# Patient Record
(demographics unavailable — no encounter records)

---

## 2025-01-30 NOTE — DATA REVIEWED
[de-identified] : MRI lumbar spine [facility] 2023: L3-L5 stenosis  [de-identified] : Xray b/l hips 06/02/23 [facility]

## 2025-01-30 NOTE — ASSESSMENT
[FreeTextEntry1] : 50 y/o M PMHx LBP, DM, HLD, CAD, hx of MI, LBP radiating down b/l legs and difficulty with lateral movement. MRI showed L3-5 stenosis. He is here to discuss surgical intervention.

## 2025-01-30 NOTE — PHYSICAL EXAM
[General Appearance - Alert] : alert [General Appearance - In No Acute Distress] : in no acute distress [Oriented To Time, Place, And Person] : oriented to person, place, and time [Impaired Insight] : insight and judgment were intact [Affect] : the affect was normal [Sensation Tactile Decrease] : light touch was intact [Balance] : balance was intact [No Tenderness to Palpation] : no spine tenderness on palpation [Straight-Leg Raise Test - Left] : straight leg raise of the left leg was positive [Straight-Leg Raise Test - Right] : straight leg raise of the right leg was positive [4] : 4/5 Ankle Dorsiflexor (L4) [5] : 5/5 Ankle Plantar Flexion (S1) [Sclera] : the sclera and conjunctiva were normal [PERRL With Normal Accommodation] : pupils were equal in size, round, reactive to light, with normal accommodation [Extraocular Movements] : extraocular movements were intact [Outer Ear] : the ears and nose were normal in appearance [Abnormal Walk] : normal gait

## 2025-01-30 NOTE — HISTORY OF PRESENT ILLNESS
[FreeTextEntry1] : lower back pain  [de-identified] : 1/30/25: 50-year-old male PMHx LBP, DM, HLD, CAD, hx of MI, recurrent pancreatitis, lower back pain radiating down b/l legs. Last visit 09/2023. Patient has had injections and participated in PT w/o relief to symptoms. Patient denies bowel/bladder dysfunction or any other acute complaints at this time. MRI showed L3-5 stenosis. He was ordered for CT lumbar spine, x-ray flexion/extension and scoliosis x-rays.   9/2023: 50 y/o M PMHx LBP, DM, HLD, CAD, hx of MI, recurrent pancreatitis and ED presents for LBP (8/10 on pain scale) radiating down b/l legs and difficulty with lateral movement. Patient was under the care of Dr. Woo for pain management who referred him to Dr. Dennis for further evaluation of LBP. Patient states that he cannot even stand for an extended period of time w/o tightness in the back. patient has had injections and participated in PT w/o relief to symptoms. Patient does not currently take any medications for pain. Patient denies bowel/bladder dysfunction or any other acute complaints at this time.

## 2025-03-04 NOTE — DATA REVIEWED
[de-identified] : MRI lumbar spine [facility] 2023: L3-L5 stenosis  [de-identified] : Xray b/l hips 06/02/23 [facility]

## 2025-03-04 NOTE — HISTORY OF PRESENT ILLNESS
[FreeTextEntry1] : lower back pain  [de-identified] : 2/25/25: 50-year-old male PMHx LBP, DM, HLD, CAD, hx of MI, and recurrent pancreatitis. He reports his symptoms remain unchanged from his last visit. Reports worsening lower back pain radiating to b/l lower extremities. He has completed conservative treatment of injections and participated in PT without relief of his symptoms. Denies bowel/bladder dysfunction. MRI showed L3-5 stenosis. He presents to review most recent imaging of his lower spine and discuss surgical intervention.  1/30/25: 50-year-old male PMHx LBP, DM, HLD, CAD, hx of MI, recurrent pancreatitis, lower back pain radiating down b/l legs. Last visit 09/2023. Patient has had injections and participated in PT w/o relief to symptoms. Patient denies bowel/bladder dysfunction or any other acute complaints at this time. MRI showed L3-5 stenosis. He was ordered for CT lumbar spine, x-ray flexion/extension and scoliosis x-rays.   9/2023: 48 y/o M PMHx LBP, DM, HLD, CAD, hx of MI, recurrent pancreatitis and ED presents for LBP (8/10 on pain scale) radiating down b/l legs and difficulty with lateral movement. Patient was under the care of Dr. Woo for pain management who referred him to Dr. Dennis for further evaluation of LBP. Patient states that he cannot even stand for an extended period of time w/o tightness in the back. patient has had injections and participated in PT w/o relief to symptoms. Patient does not currently take any medications for pain. Patient denies bowel/bladder dysfunction or any other acute complaints at this time.

## 2025-03-04 NOTE — REVIEW OF SYSTEMS
[As Noted in HPI] : as noted in HPI [Negative] : Heme/Lymph [Numbness] : numbness [Tingling] : tingling [de-identified] : + LBP

## 2025-03-04 NOTE — HISTORY OF PRESENT ILLNESS
[FreeTextEntry1] : lower back pain  [de-identified] : 2/25/25: 50-year-old male PMHx LBP, DM, HLD, CAD, hx of MI, and recurrent pancreatitis. He reports his symptoms remain unchanged from his last visit. Reports worsening lower back pain radiating to b/l lower extremities. He has completed conservative treatment of injections and participated in PT without relief of his symptoms. Denies bowel/bladder dysfunction. MRI showed L3-5 stenosis. He presents to review most recent imaging of his lower spine and discuss surgical intervention.  1/30/25: 50-year-old male PMHx LBP, DM, HLD, CAD, hx of MI, recurrent pancreatitis, lower back pain radiating down b/l legs. Last visit 09/2023. Patient has had injections and participated in PT w/o relief to symptoms. Patient denies bowel/bladder dysfunction or any other acute complaints at this time. MRI showed L3-5 stenosis. He was ordered for CT lumbar spine, x-ray flexion/extension and scoliosis x-rays.   9/2023: 48 y/o M PMHx LBP, DM, HLD, CAD, hx of MI, recurrent pancreatitis and ED presents for LBP (8/10 on pain scale) radiating down b/l legs and difficulty with lateral movement. Patient was under the care of Dr. Woo for pain management who referred him to Dr. Dennis for further evaluation of LBP. Patient states that he cannot even stand for an extended period of time w/o tightness in the back. patient has had injections and participated in PT w/o relief to symptoms. Patient does not currently take any medications for pain. Patient denies bowel/bladder dysfunction or any other acute complaints at this time.

## 2025-03-04 NOTE — DATA REVIEWED
[de-identified] : MRI lumbar spine [facility] 2023: L3-L5 stenosis  [de-identified] : Xray b/l hips 06/02/23 [facility]

## 2025-03-04 NOTE — HISTORY OF PRESENT ILLNESS
[FreeTextEntry1] : lower back pain  [de-identified] : 2/25/25: 50-year-old male PMHx LBP, DM, HLD, CAD, hx of MI, and recurrent pancreatitis. He reports his symptoms remain unchanged from his last visit. Reports worsening lower back pain radiating to b/l lower extremities. He has completed conservative treatment of injections and participated in PT without relief of his symptoms. Denies bowel/bladder dysfunction. MRI showed L3-5 stenosis. He presents to review most recent imaging of his lower spine and discuss surgical intervention.  1/30/25: 50-year-old male PMHx LBP, DM, HLD, CAD, hx of MI, recurrent pancreatitis, lower back pain radiating down b/l legs. Last visit 09/2023. Patient has had injections and participated in PT w/o relief to symptoms. Patient denies bowel/bladder dysfunction or any other acute complaints at this time. MRI showed L3-5 stenosis. He was ordered for CT lumbar spine, x-ray flexion/extension and scoliosis x-rays.   9/2023: 50 y/o M PMHx LBP, DM, HLD, CAD, hx of MI, recurrent pancreatitis and ED presents for LBP (8/10 on pain scale) radiating down b/l legs and difficulty with lateral movement. Patient was under the care of Dr. Woo for pain management who referred him to Dr. Dennis for further evaluation of LBP. Patient states that he cannot even stand for an extended period of time w/o tightness in the back. patient has had injections and participated in PT w/o relief to symptoms. Patient does not currently take any medications for pain. Patient denies bowel/bladder dysfunction or any other acute complaints at this time.

## 2025-03-04 NOTE — ASSESSMENT
[FreeTextEntry1] : 51 y/o M PMHx LBP, DM, HLD, CAD, hx of MI, LBP radiating down b/l legs and difficulty with lateral movement. MRI showed L3-5 stenosis. XR of lumbar spined showed unstable grade 1 anterolistheses of L3 on L4 and to slightly greater extent L4 on L5 without spondylolysis defects. Slightly negative coronal balance. Positive sagittal balance.  Patient continues with worsening and debilitating back pain. He has attempted conservative treatment of pain medication, physical therapy, and injections with no pain relief. I recommend surgical intervention at this time with a L2-L5 posterior lumbar laminectomy and fusion. Discussed the risk and benefits of the procedure, pre-opt requirements, and recovery period with patient. Patient verbalized understanding and agreed.

## 2025-03-04 NOTE — DATA REVIEWED
[de-identified] : MRI lumbar spine [facility] 2023: L3-L5 stenosis  [de-identified] : Xray b/l hips 06/02/23 [facility]

## 2025-03-04 NOTE — REVIEW OF SYSTEMS
[As Noted in HPI] : as noted in HPI [Negative] : Heme/Lymph [Numbness] : numbness [Tingling] : tingling [de-identified] : + LBP

## 2025-03-04 NOTE — HISTORY OF PRESENT ILLNESS
[FreeTextEntry1] : lower back pain  [de-identified] : 2/25/25: 50-year-old male PMHx LBP, DM, HLD, CAD, hx of MI, and recurrent pancreatitis. He reports his symptoms remain unchanged from his last visit. Reports worsening lower back pain radiating to b/l lower extremities. He has completed conservative treatment of injections and participated in PT without relief of his symptoms. Denies bowel/bladder dysfunction. MRI showed L3-5 stenosis. He presents to review most recent imaging of his lower spine and discuss surgical intervention.  1/30/25: 50-year-old male PMHx LBP, DM, HLD, CAD, hx of MI, recurrent pancreatitis, lower back pain radiating down b/l legs. Last visit 09/2023. Patient has had injections and participated in PT w/o relief to symptoms. Patient denies bowel/bladder dysfunction or any other acute complaints at this time. MRI showed L3-5 stenosis. He was ordered for CT lumbar spine, x-ray flexion/extension and scoliosis x-rays.   9/2023: 48 y/o M PMHx LBP, DM, HLD, CAD, hx of MI, recurrent pancreatitis and ED presents for LBP (8/10 on pain scale) radiating down b/l legs and difficulty with lateral movement. Patient was under the care of Dr. Woo for pain management who referred him to Dr. Dennis for further evaluation of LBP. Patient states that he cannot even stand for an extended period of time w/o tightness in the back. patient has had injections and participated in PT w/o relief to symptoms. Patient does not currently take any medications for pain. Patient denies bowel/bladder dysfunction or any other acute complaints at this time.

## 2025-03-04 NOTE — REVIEW OF SYSTEMS
[As Noted in HPI] : as noted in HPI [Negative] : Heme/Lymph [Numbness] : numbness [Tingling] : tingling [de-identified] : + LBP

## 2025-03-04 NOTE — ASSESSMENT
[FreeTextEntry1] : 49 y/o M PMHx LBP, DM, HLD, CAD, hx of MI, LBP radiating down b/l legs and difficulty with lateral movement. MRI showed L3-5 stenosis. XR of lumbar spined showed unstable grade 1 anterolistheses of L3 on L4 and to slightly greater extent L4 on L5 without spondylolysis defects. Slightly negative coronal balance. Positive sagittal balance.  Patient continues with worsening and debilitating back pain. He has attempted conservative treatment of pain medication, physical therapy, and injections with no pain relief. I recommend surgical intervention at this time with a L2-L5 posterior lumbar laminectomy and fusion. Discussed the risk and benefits of the procedure, pre-opt requirements, and recovery period with patient. Patient verbalized understanding and agreed.

## 2025-03-04 NOTE — REVIEW OF SYSTEMS
[As Noted in HPI] : as noted in HPI [Negative] : Heme/Lymph [Numbness] : numbness [Tingling] : tingling [de-identified] : + LBP

## 2025-03-04 NOTE — PHYSICAL EXAM
[General Appearance - Alert] : alert [General Appearance - In No Acute Distress] : in no acute distress [Oriented To Time, Place, And Person] : oriented to person, place, and time [Impaired Insight] : insight and judgment were intact [Affect] : the affect was normal [Sensation Tactile Decrease] : light touch was intact [No Tenderness to Palpation] : no spine tenderness on palpation [Straight-Leg Raise Test - Left] : straight leg raise of the left leg was positive [Straight-Leg Raise Test - Right] : straight leg raise of the right leg was positive [4] : 4/5 Ankle Dorsiflexor (L4) [5] : 5/5 Ankle Plantar Flexion (S1) [Sclera] : the sclera and conjunctiva were normal [PERRL With Normal Accommodation] : pupils were equal in size, round, reactive to light, with normal accommodation [Extraocular Movements] : extraocular movements were intact [Outer Ear] : the ears and nose were normal in appearance [Abnormal Walk] : normal gait [Balance] : balance was intact [Antalgic] : antalgic [Neck Appearance] : the appearance of the neck was normal [] : no respiratory distress [Skin Color & Pigmentation] : normal skin color and pigmentation [Able to toe walk] : the patient was not able to toe walk [Able to heel walk] : the patient was not able to heel walk

## 2025-03-04 NOTE — DATA REVIEWED
[de-identified] : MRI lumbar spine [facility] 2023: L3-L5 stenosis  [de-identified] : Xray b/l hips 06/02/23 [facility]

## 2025-04-21 NOTE — REVIEW OF SYSTEMS
[As Noted in HPI] : as noted in HPI [Numbness] : numbness [Tingling] : tingling [Negative] : Heme/Lymph [de-identified] : + LBP

## 2025-04-21 NOTE — HISTORY OF PRESENT ILLNESS
[FreeTextEntry1] : lower back pain  [de-identified] : 4/24/25: 51-year-old male with past medical history of low back pain, diabetes mellitus, hyperlipidemia, coronary artery disease, history of MI and stent placement x6 as well as coronary artery bypass and recurrent pancreatitis. Patient has been following up for chronic lower back pain with bilateral lower extremity pain. He has attempted physical therapy and spinal injections with no success in symptomatic relief. Complained of weakness and numbness. He is now s/p L2-5 Lumbar spine laminectomy and fusion on 4/11/25. Patient reports since surgery has been feeling significantly better with decrease in lower back pain. Incision healing well, denies wound drainage, redness, inflammation, fever or signs of infection.   2/25/25: 50-year-old male PMHx LBP, DM, HLD, CAD, hx of MI, and recurrent pancreatitis. He reports his symptoms remain unchanged from his last visit. Reports worsening lower back pain radiating to b/l lower extremities. He has completed conservative treatment of injections and participated in PT without relief of his symptoms. Denies bowel/bladder dysfunction. MRI showed L3-5 stenosis. He presents to review most recent imaging of his lower spine and discuss surgical intervention.  1/30/25: 50-year-old male PMHx LBP, DM, HLD, CAD, hx of MI, recurrent pancreatitis, lower back pain radiating down b/l legs. Last visit 09/2023. Patient has had injections and participated in PT w/o relief to symptoms. Patient denies bowel/bladder dysfunction or any other acute complaints at this time. MRI showed L3-5 stenosis. He was ordered for CT lumbar spine, x-ray flexion/extension and scoliosis x-rays.   9/2023: 48 y/o M PMHx LBP, DM, HLD, CAD, hx of MI, recurrent pancreatitis and ED presents for LBP (8/10 on pain scale) radiating down b/l legs and difficulty with lateral movement. Patient was under the care of Dr. Woo for pain management who referred him to Dr. Dennis for further evaluation of LBP. Patient states that he cannot even stand for an extended period of time w/o tightness in the back. patient has had injections and participated in PT w/o relief to symptoms. Patient does not currently take any medications for pain. Patient denies bowel/bladder dysfunction or any other acute complaints at this time.

## 2025-04-21 NOTE — ASSESSMENT
[FreeTextEntry1] : 52 y/o M s/p L2-5 lumbar fusion and laminectomy on 4/11/25. Overall feeling much better with improvement in lower back pain. Patient advise to begin PT and have f/u lumbar XR AP and lateral to assess hardware.    ****NOTE IS INCOMPLETE, CHART PREP, WILL BE COMPLETED ONCE PATIENT IS SEEN/EVALUATED****

## 2025-04-21 NOTE — PHYSICAL EXAM
[General Appearance - Alert] : alert [General Appearance - In No Acute Distress] : in no acute distress [Oriented To Time, Place, And Person] : oriented to person, place, and time [Impaired Insight] : insight and judgment were intact [Affect] : the affect was normal [Balance] : balance was intact [No Tenderness to Palpation] : no spine tenderness on palpation [Straight-Leg Raise Test - Left] : straight leg raise of the left leg was positive [Straight-Leg Raise Test - Right] : straight leg raise of the right leg was positive [Antalgic] : antalgic [4] : 4/5 Ankle Dorsiflexor (L4) [5] : 5/5 Ankle Plantar Flexion (S1) [Sclera] : the sclera and conjunctiva were normal [PERRL With Normal Accommodation] : pupils were equal in size, round, reactive to light, with normal accommodation [Extraocular Movements] : extraocular movements were intact [Outer Ear] : the ears and nose were normal in appearance [Neck Appearance] : the appearance of the neck was normal [] : no respiratory distress [Abnormal Walk] : normal gait [Skin Color & Pigmentation] : normal skin color and pigmentation [Able to toe walk] : the patient was not able to toe walk [Able to heel walk] : the patient was not able to heel walk

## 2025-04-21 NOTE — DATA REVIEWED
[de-identified] : MRI lumbar spine [facility] 2023: L3-L5 stenosis  [de-identified] : Xray b/l hips 06/02/23 [facility]

## 2025-06-11 NOTE — DATA REVIEWED
[de-identified] : MRI lumbar spine [facility] 2023: L3-L5 stenosis  [de-identified] : Xray b/l hips 06/02/23 [facility]

## 2025-06-11 NOTE — ASSESSMENT
[FreeTextEntry1] : 52 y/o M, is s/p L2-5 laminectomy/decompression/fusion on 4/11/25. He had ongoing increased right lower extremity pain and is now s/p bilateral re-exploration L2-5 posterior fusion and extension fusion to pelvis on 4/28/25. Incision healing well. Patient to start formal PT. Follow up in 8 weeks with repeat lumbar XR.    ****NOTE IS INCOMPLETE, CHART PREP, WILL BE COMPLETED ONCE PATIENT IS SEEN/EVALUATED****

## 2025-06-11 NOTE — HISTORY OF PRESENT ILLNESS
[FreeTextEntry1] : lower back pain  [de-identified] : 6/12/25: 51 year old man with PMH of CAD on aspirin and Plavix, MI in 2004, 2005 and 2017s/p coronary stents x 6, CABG in 2017-left internal mammary to LAD, Diabetes, diabetic neuropathy, HTN, HLD, chronic kidney disease stage 3A, cannabis use disorder, GERD, psoriatic arthritis on Enbrel, renal stones-asymptomatic, right shoulder labrum tear-s/p steroid injection, insomnia, who presented to [facility] with increased right lower extremity pain s/p L2-5 laminectomy/decompression/fusion on 4/11/25. He is s/p bilateral re-exploration L2-5 posterior fusion and extension fusion to pelvis on 4/28/25.   2/25/25: 50-year-old male PMHx LBP, DM, HLD, CAD, hx of MI, and recurrent pancreatitis. He reports his symptoms remain unchanged from his last visit. Reports worsening lower back pain radiating to b/l lower extremities. He has completed conservative treatment of injections and participated in PT without relief of his symptoms. Denies bowel/bladder dysfunction. MRI showed L3-5 stenosis. He presents to review most recent imaging of his lower spine and discuss surgical intervention.  1/30/25: 50-year-old male PMHx LBP, DM, HLD, CAD, hx of MI, recurrent pancreatitis, lower back pain radiating down b/l legs. Last visit 09/2023. Patient has had injections and participated in PT w/o relief to symptoms. Patient denies bowel/bladder dysfunction or any other acute complaints at this time. MRI showed L3-5 stenosis. He was ordered for CT lumbar spine, x-ray flexion/extension and scoliosis x-rays.   9/2023: 50 y/o M PMHx LBP, DM, HLD, CAD, hx of MI, recurrent pancreatitis and ED presents for LBP (8/10 on pain scale) radiating down b/l legs and difficulty with lateral movement. Patient was under the care of Dr. Woo for pain management who referred him to Dr. Dennis for further evaluation of LBP. Patient states that he cannot even stand for an extended period of time w/o tightness in the back. patient has had injections and participated in PT w/o relief to symptoms. Patient does not currently take any medications for pain. Patient denies bowel/bladder dysfunction or any other acute complaints at this time.

## 2025-06-11 NOTE — PHYSICAL EXAM
[General Appearance - Alert] : alert [General Appearance - In No Acute Distress] : in no acute distress [Oriented To Time, Place, And Person] : oriented to person, place, and time [Impaired Insight] : insight and judgment were intact [Affect] : the affect was normal [Balance] : balance was intact [No Tenderness to Palpation] : no spine tenderness on palpation [Straight-Leg Raise Test - Left] : straight leg raise of the left leg was positive [Straight-Leg Raise Test - Right] : straight leg raise of the right leg was positive [Antalgic] : antalgic [Able to toe walk] : the patient was not able to toe walk [Able to heel walk] : the patient was not able to heel walk [4] : 4/5 Ankle Dorsiflexor (L4) [5] : 5/5 Ankle Plantar Flexion (S1) [Sclera] : the sclera and conjunctiva were normal [PERRL With Normal Accommodation] : pupils were equal in size, round, reactive to light, with normal accommodation [Extraocular Movements] : extraocular movements were intact [Outer Ear] : the ears and nose were normal in appearance [Neck Appearance] : the appearance of the neck was normal [] : no respiratory distress [Abnormal Walk] : normal gait [Skin Color & Pigmentation] : normal skin color and pigmentation

## 2025-06-11 NOTE — REVIEW OF SYSTEMS
[As Noted in HPI] : as noted in HPI [Numbness] : numbness [Tingling] : tingling [Negative] : Heme/Lymph [de-identified] : + LBP

## 2025-07-03 NOTE — ASSESSMENT
[FreeTextEntry1] : 52 y/o M, is s/p L2-5 laminectomy/decompression/fusion on 4/11/25. He had ongoing increased right lower extremity pain and is now s/p bilateral re-exploration L2-5 posterior fusion and extension fusion to pelvis on 4/28/25. Incision healing well but since he had a recent fall he needs new studies. He will need an hip MRI, a CT of the lumbar spine and a MRI of the cervical spine.

## 2025-07-03 NOTE — PHYSICAL EXAM
[General Appearance - Alert] : alert [General Appearance - In No Acute Distress] : in no acute distress [Oriented To Time, Place, And Person] : oriented to person, place, and time [Impaired Insight] : insight and judgment were intact [Affect] : the affect was normal [Balance] : balance was intact [No Tenderness to Palpation] : no spine tenderness on palpation [Straight-Leg Raise Test - Left] : straight leg raise of the left leg was positive [Straight-Leg Raise Test - Right] : straight leg raise of the right leg was positive [Antalgic] : antalgic [Sclera] : the sclera and conjunctiva were normal [PERRL With Normal Accommodation] : pupils were equal in size, round, reactive to light, with normal accommodation [Extraocular Movements] : extraocular movements were intact [Outer Ear] : the ears and nose were normal in appearance [Neck Appearance] : the appearance of the neck was normal [] : no respiratory distress [Abnormal Walk] : normal gait [Skin Color & Pigmentation] : normal skin color and pigmentation [Able to toe walk] : the patient was not able to toe walk [Able to heel walk] : the patient was not able to heel walk [4] : 4/5 Great Toe Extension (L5) [5] : 5/5 Ankle Dorsiflexor (L4)

## 2025-07-03 NOTE — REVIEW OF SYSTEMS
[As Noted in HPI] : as noted in HPI [Numbness] : numbness [Tingling] : tingling [Negative] : Heme/Lymph [de-identified] : + LBP

## 2025-07-03 NOTE — DATA REVIEWED
[de-identified] : MRI lumbar spine [facility] 2023: L3-L5 stenosis  [de-identified] : Xray b/l hips 06/02/23 [facility]

## 2025-07-03 NOTE — HISTORY OF PRESENT ILLNESS
[FreeTextEntry1] : lower back pain  [de-identified] : 7//3/25 51 year old man with PMH of CAD on aspirin and Plavix, MI in 2004, 2005 and 2017s/p coronary stents x 6, CABG in 2017-left internal mammary to LAD, Diabetes, diabetic neuropathy, HTN, HLD, chronic kidney disease stage 3A, cannabis use disorder, GERD, psoriatic arthritis on Enbrel, renal stones-asymptomatic, right shoulder labrum tear-s/p steroid injection, insomnia. He is s/p lumbar fusion followed by acute revision to L2 to the pelvis. He was readmitted to [facility] several weeks ago and sent to rehabilitation.He was doing well until he had a fall 2 weeks ago. Now he has excruciating right hip pain that makes it difficult to move, walk and sit. He has no  bowel or bladder incontinence.   6/12/25: 51 year old man with PMH of CAD on aspirin and Plavix, MI in 2004, 2005 and 2017s/p coronary stents x 6, CABG in 2017-left internal mammary to LAD, Diabetes, diabetic neuropathy, HTN, HLD, chronic kidney disease stage 3A, cannabis use disorder, GERD, psoriatic arthritis on Enbrel, renal stones-asymptomatic, right shoulder labrum tear-s/p steroid injection, insomnia, who presented to [facility] with increased right lower extremity pain s/p L2-5 laminectomy/decompression/fusion on 4/11/25. He is s/p bilateral re-exploration L2-5 posterior fusion and extension fusion to pelvis on 4/28/25.   2/25/25: 50-year-old male PMHx LBP, DM, HLD, CAD, hx of MI, and recurrent pancreatitis. He reports his symptoms remain unchanged from his last visit. Reports worsening lower back pain radiating to b/l lower extremities. He has completed conservative treatment of injections and participated in PT without relief of his symptoms. Denies bowel/bladder dysfunction. MRI showed L3-5 stenosis. He presents to review most recent imaging of his lower spine and discuss surgical intervention.  1/30/25: 50-year-old male PMHx LBP, DM, HLD, CAD, hx of MI, recurrent pancreatitis, lower back pain radiating down b/l legs. Last visit 09/2023. Patient has had injections and participated in PT w/o relief to symptoms. Patient denies bowel/bladder dysfunction or any other acute complaints at this time. MRI showed L3-5 stenosis. He was ordered for CT lumbar spine, x-ray flexion/extension and scoliosis x-rays.   9/2023: 50 y/o M PMHx LBP, DM, HLD, CAD, hx of MI, recurrent pancreatitis and ED presents for LBP (8/10 on pain scale) radiating down b/l legs and difficulty with lateral movement. Patient was under the care of Dr. Woo for pain management who referred him to Dr. Dennis for further evaluation of LBP. Patient states that he cannot even stand for an extended period of time w/o tightness in the back. patient has had injections and participated in PT w/o relief to symptoms. Patient does not currently take any medications for pain. Patient denies bowel/bladder dysfunction or any other acute complaints at this time.